# Patient Record
Sex: MALE | Race: WHITE | NOT HISPANIC OR LATINO | Employment: PART TIME | URBAN - METROPOLITAN AREA
[De-identification: names, ages, dates, MRNs, and addresses within clinical notes are randomized per-mention and may not be internally consistent; named-entity substitution may affect disease eponyms.]

---
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
## 2022-09-22 ENCOUNTER — APPOINTMENT (OUTPATIENT)
Dept: GENERAL RADIOLOGY | Facility: HOSPITAL | Age: 22
End: 2022-09-22

## 2022-09-22 ENCOUNTER — HOSPITAL ENCOUNTER (OUTPATIENT)
Facility: HOSPITAL | Age: 22
Discharge: HOME | End: 2022-09-22
Attending: EMERGENCY MEDICINE | Admitting: EMERGENCY MEDICINE

## 2022-09-22 VITALS
BODY MASS INDEX: 33.13 KG/M2 | DIASTOLIC BLOOD PRESSURE: 91 MMHG | TEMPERATURE: 97 F | WEIGHT: 250 LBS | SYSTOLIC BLOOD PRESSURE: 143 MMHG | RESPIRATION RATE: 18 BRPM | HEART RATE: 76 BPM | HEIGHT: 73 IN | OXYGEN SATURATION: 96 %

## 2022-09-22 DIAGNOSIS — M25.512 ACUTE PAIN OF LEFT SHOULDER: Primary | ICD-10-CM

## 2022-09-22 DIAGNOSIS — G89.29 CHRONIC PAIN OF BOTH SHOULDERS: ICD-10-CM

## 2022-09-22 DIAGNOSIS — S43.109A ACROMIOCLAVICULAR JOINT SEPARATION, TYPE 1, INITIAL ENCOUNTER: ICD-10-CM

## 2022-09-22 DIAGNOSIS — M25.511 CHRONIC PAIN OF BOTH SHOULDERS: ICD-10-CM

## 2022-09-22 DIAGNOSIS — M25.512 CHRONIC PAIN OF BOTH SHOULDERS: ICD-10-CM

## 2022-09-22 PROCEDURE — 99203 OFFICE O/P NEW LOW 30 MIN: CPT | Performed by: EMERGENCY MEDICINE

## 2022-09-22 PROCEDURE — G0463 HOSPITAL OUTPT CLINIC VISIT: HCPCS | Performed by: EMERGENCY MEDICINE

## 2022-09-22 PROCEDURE — 73030 X-RAY EXAM OF SHOULDER: CPT

## 2022-09-22 PROCEDURE — 25010000002 KETOROLAC TROMETHAMINE PER 15 MG: Performed by: EMERGENCY MEDICINE

## 2022-09-22 PROCEDURE — 73030 X-RAY EXAM OF SHOULDER: CPT | Performed by: EMERGENCY MEDICINE

## 2022-09-22 RX ORDER — KETOROLAC TROMETHAMINE 30 MG/ML
30 INJECTION, SOLUTION INTRAMUSCULAR; INTRAVENOUS ONCE
Status: COMPLETED | OUTPATIENT
Start: 2022-09-22 | End: 2022-09-22

## 2022-09-22 RX ADMIN — KETOROLAC TROMETHAMINE 30 MG: 30 INJECTION, SOLUTION INTRAMUSCULAR at 12:53

## 2022-09-22 NOTE — DISCHARGE INSTRUCTIONS
Please follow-up with Dr. Reddy.  Phone #[phone] he has an orthopedist.    Use Tylenol and Motrin for pain.

## 2022-09-22 NOTE — ED PROVIDER NOTES
Subjective   History of Present Illness  Patient is a 21-year-old presents with left shoulder pain.  He has had multiple evaluations including MRI.  The wary about possibility of arthritis or fibromyalgia but his work-ups have been negative.  The patient came for worsening pain in his left shoulder.  He denies any other significant complaints.  Pain with range of motion.  He denies any falls or injury.  This has been a chronic issue.  Is acute exacerbation of chronic issue.        Review of Systems   Constitutional: Negative.    HENT: Negative.  Negative for sore throat.    Eyes: Negative.    Respiratory: Negative.  Negative for cough and shortness of breath.    Cardiovascular: Negative.  Negative for chest pain.   Gastrointestinal: Negative for abdominal pain, diarrhea, nausea and vomiting.   Endocrine: Negative.    Genitourinary: Negative.    Musculoskeletal: Negative.    Skin: Negative.    Allergic/Immunologic: Negative.    Neurological: Negative.  Negative for syncope, numbness and headaches.   Hematological: Negative.    Psychiatric/Behavioral: Negative.  Negative for confusion.   All other systems reviewed and are negative.      History reviewed. No pertinent past medical history.    No Known Allergies    History reviewed. No pertinent surgical history.    History reviewed. No pertinent family history.    Social History     Socioeconomic History   • Marital status: Single           Objective   Physical Exam  Vitals and nursing note reviewed.   Constitutional:       Appearance: Normal appearance. He is normal weight.   HENT:      Head: Normocephalic and atraumatic.      Right Ear: External ear normal.      Left Ear: External ear normal.      Nose: Nose normal.      Mouth/Throat:      Mouth: Mucous membranes are moist.      Pharynx: Oropharynx is clear.   Eyes:      Extraocular Movements: Extraocular movements intact.      Conjunctiva/sclera: Conjunctivae normal.      Pupils: Pupils are equal, round, and  reactive to light.   Cardiovascular:      Rate and Rhythm: Normal rate and regular rhythm.      Pulses: Normal pulses.      Heart sounds: Normal heart sounds.   Pulmonary:      Effort: Pulmonary effort is normal.      Breath sounds: Normal breath sounds.   Abdominal:      General: Abdomen is flat.      Palpations: Abdomen is soft.      Tenderness: There is no abdominal tenderness.   Musculoskeletal:         General: Normal range of motion.      Cervical back: Normal range of motion and neck supple. No rigidity.      Comments: Left shoulder pain on range of motion.  No acute deformity.  No AC joint tenderness no anterior humeral deformity or dislocation.  No signs of fracture.  Pain is with abduction external rotation seems to be localized to the deltoid.   Skin:     General: Skin is warm and dry.   Neurological:      General: No focal deficit present.      Mental Status: He is alert and oriented to person, place, and time. Mental status is at baseline.      Cranial Nerves: No cranial nerve deficit.      Sensory: No sensory deficit.      Motor: No weakness.      Gait: Gait normal.   Psychiatric:         Mood and Affect: Mood normal.         Behavior: Behavior normal.         Thought Content: Thought content normal.         Judgment: Judgment normal.         Procedures           ED Course  ED Course as of 09/22/22 1401   Thu Sep 22, 2022   1359   IMPRESSION:  1. Negative for fracture or dislocation.  2. Mild grade 1 AC joint injury [CT]   1400 XR Shoulder 2+ View Left [CT]   1400 Patient may have a chronic AC joint injury. [CT]      ED Course User Index  [CT] Donato Ball MD                                           MDM  Number of Diagnoses or Management Options  Acute pain of left shoulder  Chronic pain of both shoulders  Diagnosis management comments: Chronic shoulder pain.  At this point there is not appear to be acute emergency.  The x-ray is normal the patient will be discharged.  The patient was given Toradol  for pain.  Patient will be instructed to follow-up with orthopedics.    Risk of Complications, Morbidity, and/or Mortality  General comments: The patient's x-ray does not show any fracture or dislocation.  There is a questionable AC joint grade 1 injury.  The patient will be instructed on this.  This is a chronic issue.  The patient referred to orthopedics.        Final diagnoses:   Acute pain of left shoulder   Chronic pain of both shoulders   Acromioclavicular joint separation, type 1, initial encounter       ED Disposition  ED Disposition     ED Disposition   Discharge    Condition   Stable    Comment   --             Primary care doctor    Schedule an appointment as soon as possible for a visit   If symptoms worsen         Medication List      No changes were made to your prescriptions during this visit.          Donato Ball MD  09/22/22 9122

## 2023-08-16 ENCOUNTER — HOSPITAL ENCOUNTER (OUTPATIENT)
Facility: HOSPITAL | Age: 23
Discharge: HOME | End: 2023-08-16
Attending: PEDIATRICS | Admitting: PEDIATRICS
Payer: MEDICAID

## 2023-08-16 VITALS
TEMPERATURE: 98.7 F | OXYGEN SATURATION: 97 % | WEIGHT: 240 LBS | RESPIRATION RATE: 16 BRPM | HEIGHT: 73 IN | BODY MASS INDEX: 31.81 KG/M2 | HEART RATE: 94 BPM

## 2023-08-16 DIAGNOSIS — J02.9 PHARYNGITIS, UNSPECIFIED ETIOLOGY: Primary | ICD-10-CM

## 2023-08-16 DIAGNOSIS — L60.0 INGROWN TOENAIL OF BOTH FEET: ICD-10-CM

## 2023-08-16 LAB — STREP A PCR: NOT DETECTED

## 2023-08-16 PROCEDURE — G0463 HOSPITAL OUTPT CLINIC VISIT: HCPCS

## 2023-08-16 PROCEDURE — 87651 STREP A DNA AMP PROBE: CPT | Performed by: PEDIATRICS

## 2023-08-16 PROCEDURE — 25010000002 DEXAMETHASONE PER 1 MG

## 2023-08-16 RX ORDER — CEPHALEXIN 500 MG/1
500 CAPSULE ORAL 4 TIMES DAILY
Qty: 20 CAPSULE | Refills: 0 | Status: SHIPPED | OUTPATIENT
Start: 2023-08-16 | End: 2023-08-21

## 2023-08-16 RX ADMIN — DEXAMETHASONE SODIUM PHOSPHATE 10 MG: 10 INJECTION, SOLUTION INTRAMUSCULAR; INTRAVENOUS at 16:35

## 2023-08-16 NOTE — DISCHARGE INSTRUCTIONS
Thank you for letting us care for you today.  Take the prescribed antibiotic medicine you are given as directed until it is gone. Take it even if you feel better. It treats the infection and stops it from returning. Not taking all the medicine can make future infections hard to treat.  You can do warm salt water gargles several times a day.  To make a salt-water mixture, completely dissolve «-1 tsp (3-6 g) of salt in 1 cup (237 mL) of warm water.  This can be done 4-5 times a day with a fresh batch of salt and warm water.  Please follow-up with your primary care provider for continued evaluation.  I also recommend that you follow-up with Dr. Winchester regarding your ingrown toenails.  Return to the emergency room for any new or concerning symptoms.    Check your wound every day for signs of infection, or as often as told by your health care provider. Check for:  More redness, swelling, or pain.  More fluid or blood.  Warmth.  Pus or a bad smell.  Do not pick at your toenail or try to remove it yourself.  Soak your foot in warm, soapy water. Do this for 20 minutes, 3 times a day, or as often as told by your health care provider. This helps to keep your toe clean and your skin soft.  Wear shoes that fit well and are not too tight. Your health care provider may recommend that you wear open-toed shoes while you heal.  Trim your toenails regularly and carefully. Cut your toenails straight across to prevent injury to the skin at the corners of the toenail. Do not cut your nails in a curved shape.  Keep your feet clean and dry to help prevent infection.

## 2023-08-16 NOTE — FSED PROVIDER NOTE
[facility]    EMERGENCY [facility]PARTMENT ENCOUNTER    Room Number:  09/09  Date seen:  8/16/2023  Time seen: 17:03 EDT  PCP: Willem Durbin APRN  Historian: patient     HPI:  Chief complaint:sore throat, ingrown toenail  Context:Errol Coyne is a 22 y.o. male who presents to the ED with c/o sore throat, ingrown toenail.  Patient reports that he has been having a sore throat that started today.  He reports that when he swallows he has a bad taste in his mouth and that is been going on for the last couple weeks.  He also reports that he has bilateral ingrown toenails on his great toes.  Patient reports he started to notice some redness to the left toenail.  He reports that his toenail has been ingrown for several months.  He reports he has never been seen for this before.  Patient is nontoxic in appearance.  He is able to swallow without difficulty.  He denies any pain when opening his mouth.  He is able to tolerate his oral secretions without difficulty.    Timing: Constant   Duration: 2 weeks  Location: Throat and toenails  Radiation: Nonradiating  Quality: Soreness  Intensity/Severity: Mild  Associated Symptoms: Sore throat, ingrown toenails  Aggravating Factors: No known aggravating  Alleviating Factors: No attempted home treatment  Treatment before arrival: None    The patient was placed in a mask in triage, hand hygiene was performed before and after my interaction with the patient.  I wore a mask and gloves during my entire interaction with the patient.    MEDICAL RECORD REVIEW  None    ALLERGIES  Patient has no known allergies.    PAST MEDICAL HISTORY  Active Ambulatory Problems     Diagnosis Date Noted    No Active Ambulatory Problems     Resolved Ambulatory Problems     Diagnosis Date Noted    No Resolved Ambulatory Problems     No Additional Past Medical History       PAST SURGICAL HISTORY  History reviewed. No pertinent surgical history.    FAMILY  HISTORY  History reviewed. No pertinent family history.    SOCIAL HISTORY  Social History     Socioeconomic History    Marital status: Single       REVIEW OF SYSTEMS  Review of Systems    All systems reviewed and negative except for those discussed in HPI.     PHYSICAL EXAM    I have reviewed the triage vital signs and nursing notes.    ED Triage Vitals [08/16/23 1509]   Temp Heart Rate Resp BP SpO2   98.7 øF (37.1 øC) 94 16 -- 97 %      Temp src Heart Rate Source Patient Position BP Location FiO2 (%)   Temporal Monitor Sitting Right arm --       Physical Exam  Constitutional:       Appearance: Normal appearance.   HENT:      Right Ear: Tympanic membrane and ear canal normal.      Left Ear: Tympanic membrane and ear canal normal.      Nose: Nose normal. No congestion.      Mouth/Throat:      Mouth: Mucous membranes are moist.      Pharynx: Oropharynx is clear. No oropharyngeal exudate or posterior oropharyngeal erythema.   Eyes:      Extraocular Movements: Extraocular movements intact.      Pupils: Pupils are equal, round, and reactive to light.   Cardiovascular:      Rate and Rhythm: Normal rate and regular rhythm.      Pulses: Normal pulses.      Heart sounds: Normal heart sounds.   Pulmonary:      Effort: Pulmonary effort is normal.      Breath sounds: Normal breath sounds.   Musculoskeletal:         General: Normal range of motion.   Feet:      Comments: Mildly ingrown toenail to both great toes.  Left great toe does show a little bit of surrounding erythema.  Skin:     General: Skin is warm.      Capillary Refill: Capillary refill takes less than 2 seconds.   Neurological:      General: No focal deficit present.      Mental Status: He is alert.   Psychiatric:         Mood and Affect: Mood normal.         Behavior: Behavior normal.       Vital signs and nursing notes reviewed.        LAB RESULTS  Recent Results (from the past 24 hour(s))   Rapid Strep A Screen - Swab, Throat    Collection Time: 08/16/23  3:11 PM     Specimen: Throat; Swab   Result Value Ref Range    STREP A PCR Not Detected Not Detected       Ordered the above labs and independently reviewed the results.      RADIOLOGY RESULTS  No Radiology Exams Resulted Within Past 24 Hours       I ordered the above noted radiological studies. Independently reviewed by me and discussed with radiologist.  See dictation above for official radiology interpretation.      Orders placed during this visit:  Orders Placed This Encounter   Procedures    Rapid Strep A Screen - Swab, Throat           PROCEDURES    Procedures        MEDICATIONS GIVEN IN ER    Medications   dexAMETHasone (DECADRON) 10 MG/ML oral solution 10 mg (10 mg Oral Given 8/16/23 1635)         PROGRESS, DATA ANALYSIS, CONSULTS, AND MEDICAL DECISION MAKING    All labs have been independently reviewed by me.  All radiology studies have been reviewed by me.   EKG's independently reviewed by me.  Discussion below represents my analysis of pertinent findings related to patient's condition, differential diagnosis, treatment plan and final disposition.    I rechecked the patient.  I discussed the patient's labs, radiology findings (including all incidental findings), diagnosis, and plan for discharge.  A repeat exam reveals no new worrisome changes from my initial exam findings.  The patient understands that the fact that they are being discharged does not denote that nothing is abnormal, it indicates that no clinical emergency is present and that they must follow-up as directed in order to properly maintain their health.  Follow-up instructions (specifically listed below) and return to ER precautions were given at this time.  I specifically instructed the patient to follow-up with their PCP.  The patient understands and agrees with the plan, and is ready for discharge.  All questions answered.         AS OF 17:09 EDT VITALS:    BP -    HR - 94  TEMP - 98.7 øF (37.1 øC) (Temporal)  02 SATS - 97%    Medical Decision  Making  MEDICAL DECISION  Lab interpretation:  Labs viewed by me significant for, negative strep    No history of immunocompromise. Nontoxic appearance. Patient euvolemic with no trismus. No airway compromise. No change in voice, exudates, enlarged lymph nodes. Able to tolerate PO. Given History and Exam I have low suspicion for this presentation being caused by PTA, RPA, Ludwigs angina, Epiglottitis or Bacterial Tracheitis, EBV, or Strep throat.  Patient was given a dose of Decadron while in the emergency room.  I will also cover him with antibiotics for an ingrown toenail.  The left is worse than the right at this time.  Instructed him to use over-the-counter medications for his sore throat and to follow-up with his primary care provider.  I also discussed following up with Dr. Winchester for his ingrown toenails.  Patient is agreeable to the plan of care and denies any questions at this time.              Problems Addressed:  Ingrown toenail of both feet: complicated acute illness or injury  Pharyngitis, unspecified etiology: complicated acute illness or injury    Risk  Prescription drug management.          DIAGNOSIS  Final diagnoses:   Pharyngitis, unspecified etiology   Ingrown toenail of both feet       New Medications Ordered This Visit   Medications    dexAMETHasone (DECADRON) 10 MG/ML oral solution 10 mg    cephalexin (KEFLEX) 500 MG capsule     Sig: Take 1 capsule by mouth 4 (Four) Times a Day for 5 days.     Dispense:  20 capsule     Refill:  0           I performed hand hygiene on entry into the pt room and upon exit.     Dictated utilizing Dragon dictation     Note Disclaimer: At [facility], we believe that sharing information builds trust and better relationships. You are receiving this note because you recently visited [facility]. It is possible you will see health information before a provider has talked with you about it. This kind of information can be easy to misunderstand. To help you fully  understand what it means for your health, we urge you to discuss this note with your provider.

## 2023-08-16 NOTE — Clinical Note
[facility] [place] [address] [place] [address] [place] [address]  Phone: [phone]    Errol Coyne was seen and treated in our emergency department on 8/16/2023.  He may return to work on 08/17/2023.         Thank you for choosing [facility].    Val Dong APRN